# Patient Record
Sex: FEMALE | Race: WHITE | NOT HISPANIC OR LATINO | URBAN - METROPOLITAN AREA
[De-identification: names, ages, dates, MRNs, and addresses within clinical notes are randomized per-mention and may not be internally consistent; named-entity substitution may affect disease eponyms.]

---

## 2023-12-04 NOTE — PROGRESS NOTES
Patient ID: Alka Howell is a 58 y.o. female     Patient Care Team:  Head, TUTU Arevalo as PCP - General (Nurse Practitioner)    Subjective     Chief Complaint   Patient presents with    mental health referral     Daughter passed away 1 1/2 ago. Having concentration and anxiety issues    Establish Care    GI referral     Rectal issue       History of Present Illness    Alka Howell presents to [facility] today to establish care with our practice.      Moved here from [place] a year ago to be closer to family.     Having problems with difficulty concentrating.  Unable to focus on task.  Requesting referral to psychiatry.  Has not taken medication in the past however feels she has suffered from anxiety and depression for awhile.  Was able to control on own. No thoughts of hurting self.    Problems with anxiety and depression. Wakes up every morning with anxiety.  Daughter passed away unexpectedly 1 1/2 years ago.  Unknown cause.  Was found in hotel room.  She had tested + for Covid at the time so autopsy was not completed.  Has not had complete closure concerning this due to unknown cause of her passing. Daily marijuana use which helps control her anxiety.     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  Currently looking for new job.      Has noticed bulge to rectal area.  Possible prolapse.  Has been present for at least 10 years.  Comes out with bowel movements and then has to push back in.  Will also come out with walking around. No constipation or diarrhea,  Will have some rectal bleeding with wiping.    Never had a colonoscopy.      Greater than 10 years ago for Pap and mammogram.    Smoker of 0.25 ppd for 30 years.  Trying to quit however unable due to current stressors.      She denies any complaints of fever, chills, cough, chest pain, shortness of air, abdominal pain, nausea, or any other concerns.     The following  portions of the patient's history were reviewed and updated as appropriate: allergies, current medications, past family history, past medical history, past social history, past surgical history and problem list.       ROS    Vitals:    12/05/23 0823   BP: 130/80   Pulse: 68   Temp: 98 °F (36.7 °C)   SpO2: 98%       Documented weights    12/05/23 0823   Weight: 76.7 kg (169 lb)     Body mass index is 27.28 kg/m².    Results for orders placed or performed in visit on 12/05/23   POCT occult blood x 1 stool    Specimen: Stool   Result Value Ref Range    Fecal Occult Blood Positive (A) Negative    Lot Number [id number]     Expiration Date 3/31/25     DEVELOPER LOT NUMBER [id number]     DEVELOPER EXPIRATION DATE 3/31/25     Positive Control Positive Positive    Negative Control Negative Negative           Objective     Physical Exam  Exam conducted with a chaperone present (Rody Roman MA).   Constitutional:       General: She is not in acute distress.     Appearance: She is well-developed.   HENT:      Head: Normocephalic and atraumatic.      Right Ear: Tympanic membrane normal.      Left Ear: Tympanic membrane normal.      Nose: Nose normal.   Eyes:      Pupils: Pupils are equal, round, and reactive to light.   Cardiovascular:      Rate and Rhythm: Normal rate and regular rhythm.      Heart sounds: Normal heart sounds. No murmur heard.  Pulmonary:      Effort: Pulmonary effort is normal.      Breath sounds: Normal breath sounds. No wheezing, rhonchi or rales.   Abdominal:      General: Bowel sounds are normal.      Palpations: Abdomen is soft.      Tenderness: There is no abdominal tenderness.   Genitourinary:     Rectum: Guaiac result positive. No mass, tenderness or anal fissure. Abnormal anal tone.   Musculoskeletal:         General: No tenderness. Normal range of motion.      Cervical back: Normal range of motion and neck supple.   Skin:     General: Skin is warm and dry.      Findings: No erythema or rash.    Neurological:      Mental Status: She is alert and oriented to person, place, and time.   Psychiatric:         Behavior: Behavior normal.     POCT occult blood x 1 stool (12/05/2023 09:08)    Patient's (Body mass index is 27.28 kg/m².) indicates that they are overweight (BMI 25-29.9) with health related conditions that include none . Weight is newly identified. BMI is is above average; BMI management plan is completed. We discussed portion control, increasing exercise, and Information on healthy weight added to patient's after visit summary.        Assessment & Plan     Assessment/Plan     Diagnoses and all orders for this visit:    1. Encounter to establish care (Primary)    2. Encounter for Hemoccult screening  -     POCT occult blood x 1 stool    3. Healthcare maintenance  -     CBC (No Diff)  -     Lipid Panel With / Chol / HDL Ratio  -     Comprehensive Metabolic Panel  -     TSH Rfx On Abnormal To Free T4    4. Elevated liver function tests  -     Comprehensive Metabolic Panel    5. Screening for lipid disorders  -     Lipid Panel With / Chol / HDL Ratio    6. Anxiety and depression  -     CBC (No Diff)  -     Comprehensive Metabolic Panel  -     TSH Rfx On Abnormal To Free T4  -     Ambulatory Referral to Psychiatry    7. Heme positive stool  -     Ambulatory Referral to Colorectal Surgery    8. Colon cancer screening  -     Ambulatory Referral to Colorectal Surgery    9. Decreased rectal sphincter tone  -     Ambulatory Referral to Colorectal Surgery    10. Encounter for gynecological examination  -     Ambulatory Referral to Gynecology    11. Concentration deficit  -     Ambulatory Referral to Psychiatry      Follow Up:  Return if symptoms worsen or fail to improve, for Annual.    In the meantime, instructed to contact us sooner for any problems or concerns.    Patient was given instructions and counseling regarding condition or for health maintenance advice.  Please see specific information pulled into  the AVS if appropriate.          Lary Camara, APRN  Family Medicine  [facility] [place]  12/05/23  10:12 EST

## 2023-12-05 ENCOUNTER — OFFICE VISIT (OUTPATIENT)
Dept: FAMILY MEDICINE CLINIC | Facility: CLINIC | Age: 58
End: 2023-12-05
Payer: MEDICAID

## 2023-12-05 VITALS
DIASTOLIC BLOOD PRESSURE: 80 MMHG | HEART RATE: 68 BPM | SYSTOLIC BLOOD PRESSURE: 130 MMHG | HEIGHT: 66 IN | BODY MASS INDEX: 27.16 KG/M2 | TEMPERATURE: 98 F | WEIGHT: 169 LBS | OXYGEN SATURATION: 98 %

## 2023-12-05 DIAGNOSIS — F41.9 ANXIETY AND DEPRESSION: ICD-10-CM

## 2023-12-05 DIAGNOSIS — Z76.89 ENCOUNTER TO ESTABLISH CARE: Primary | ICD-10-CM

## 2023-12-05 DIAGNOSIS — F32.A ANXIETY AND DEPRESSION: ICD-10-CM

## 2023-12-05 DIAGNOSIS — Z12.11 ENCOUNTER FOR HEMOCCULT SCREENING: ICD-10-CM

## 2023-12-05 DIAGNOSIS — Z00.00 HEALTHCARE MAINTENANCE: ICD-10-CM

## 2023-12-05 DIAGNOSIS — R41.840 CONCENTRATION DEFICIT: ICD-10-CM

## 2023-12-05 DIAGNOSIS — R19.5 HEME POSITIVE STOOL: ICD-10-CM

## 2023-12-05 DIAGNOSIS — R79.89 ELEVATED LIVER FUNCTION TESTS: ICD-10-CM

## 2023-12-05 DIAGNOSIS — Z13.220 SCREENING FOR LIPID DISORDERS: ICD-10-CM

## 2023-12-05 DIAGNOSIS — Z12.11 COLON CANCER SCREENING: ICD-10-CM

## 2023-12-05 DIAGNOSIS — K62.89 DECREASED RECTAL SPHINCTER TONE: ICD-10-CM

## 2023-12-05 DIAGNOSIS — Z01.419 ENCOUNTER FOR GYNECOLOGICAL EXAMINATION: ICD-10-CM

## 2023-12-05 LAB
DEVELOPER EXPIRATION DATE: ABNORMAL
DEVELOPER LOT NUMBER: ABNORMAL
EXPIRATION DATE: ABNORMAL
FECAL OCCULT BLOOD SCREEN, POC: POSITIVE
Lab: ABNORMAL
NEGATIVE CONTROL: NEGATIVE
POSITIVE CONTROL: POSITIVE

## 2023-12-06 LAB
ALBUMIN SERPL-MCNC: 4.9 G/DL (ref 3.5–5.2)
ALBUMIN/GLOB SERPL: 2.1 G/DL
ALP SERPL-CCNC: 143 U/L (ref 39–117)
ALT SERPL-CCNC: 49 U/L (ref 1–33)
AST SERPL-CCNC: 39 U/L (ref 1–32)
BILIRUB SERPL-MCNC: 0.6 MG/DL (ref 0–1.2)
BUN SERPL-MCNC: 16 MG/DL (ref 6–20)
BUN/CREAT SERPL: 19.8 (ref 7–25)
CALCIUM SERPL-MCNC: 10.1 MG/DL (ref 8.6–10.5)
CHLORIDE SERPL-SCNC: 104 MMOL/L (ref 98–107)
CHOLEST SERPL-MCNC: 246 MG/DL (ref 0–200)
CHOLEST/HDLC SERPL: 3.32 {RATIO}
CO2 SERPL-SCNC: 25.4 MMOL/L (ref 22–29)
CREAT SERPL-MCNC: 0.81 MG/DL (ref 0.57–1)
EGFRCR SERPLBLD CKD-EPI 2021: 85.3 ML/MIN/1.73
ERYTHROCYTE [DISTWIDTH] IN BLOOD BY AUTOMATED COUNT: 13.2 % (ref 12.3–15.4)
GLOBULIN SER CALC-MCNC: 2.3 GM/DL
GLUCOSE SERPL-MCNC: 94 MG/DL (ref 65–99)
HCT VFR BLD AUTO: 44.5 % (ref 34–46.6)
HDLC SERPL-MCNC: 74 MG/DL (ref 40–60)
HGB BLD-MCNC: 15 G/DL (ref 12–15.9)
LDLC SERPL CALC-MCNC: 158 MG/DL (ref 0–100)
MCH RBC QN AUTO: 29.9 PG (ref 26.6–33)
MCHC RBC AUTO-ENTMCNC: 33.7 G/DL (ref 31.5–35.7)
MCV RBC AUTO: 88.6 FL (ref 79–97)
PLATELET # BLD AUTO: 302 10*3/MM3 (ref 140–450)
POTASSIUM SERPL-SCNC: 5.2 MMOL/L (ref 3.5–5.2)
PROT SERPL-MCNC: 7.2 G/DL (ref 6–8.5)
RBC # BLD AUTO: 5.02 10*6/MM3 (ref 3.77–5.28)
SODIUM SERPL-SCNC: 142 MMOL/L (ref 136–145)
TRIGL SERPL-MCNC: 81 MG/DL (ref 0–150)
TSH SERPL DL<=0.005 MIU/L-ACNC: 1.43 UIU/ML (ref 0.27–4.2)
VLDLC SERPL CALC-MCNC: 14 MG/DL (ref 5–40)
WBC # BLD AUTO: 8.19 10*3/MM3 (ref 3.4–10.8)

## 2024-01-12 ENCOUNTER — OFFICE VISIT (OUTPATIENT)
Dept: SURGERY | Facility: CLINIC | Age: 59
End: 2024-01-12
Payer: MEDICAID

## 2024-01-12 VITALS
HEART RATE: 76 BPM | BODY MASS INDEX: 27.42 KG/M2 | WEIGHT: 170.6 LBS | HEIGHT: 66 IN | DIASTOLIC BLOOD PRESSURE: 72 MMHG | OXYGEN SATURATION: 98 % | SYSTOLIC BLOOD PRESSURE: 118 MMHG

## 2024-01-12 DIAGNOSIS — K64.8 INTERNAL HEMORRHOIDS WITH COMPLICATION: Primary | ICD-10-CM

## 2024-01-12 DIAGNOSIS — Z83.719 FAMILY HISTORY OF COLONIC POLYPS: ICD-10-CM

## 2024-01-12 RX ORDER — HYDROCORTISONE 25 MG/G
CREAM TOPICAL
Qty: 30 G | Refills: 1 | Status: SHIPPED | OUTPATIENT
Start: 2024-01-12

## 2024-01-12 NOTE — PROGRESS NOTES
Alka Howell is a 58 y.o. female who is seen as a consult at the request of TUTU Dodge for Rectal Bleeding.      HPI:  Pt presents today for evaluation of prolapsing anal tissue x15 years.   Size of tissue varies. Some days are worse than others.   Tissue prolapses upon defecation and occasionally when coughing.   Has to manually reduce.   Intermittent bleeding with varied amount of blood.   Occasional mucus drainage, but only a small amount.   Denies any associated pain.   Does not use any creams or suppositories.      Has 1 BM daily  Esmeralda: 3 or 5  No straining  Not taking any fiber, SS, or laxatives.     No ASA or anticoagulation.   No previous anorectal surgeries.     Pt has never had a colonoscopy performed in the past.  States she was scheduled for one previously, but became anxious and cancelled it.     Both mother and father with Hx of colon polyps.   No known FHx of colon cancer or IBD.       Past Medical History:   Diagnosis Date    Anxiety     Asthma ?    Shanna had minor asthma since i wss a kid    Concentration deficit     Decreased anal sphincter tone 2023    Depression     Elevated liver function tests 2023    Fracture of right great toe 2023    SEEN AT     Heme positive stool 2023    Rectal bleeding        Past Surgical History:   Procedure Laterality Date    BREAST AUGMENTATION Bilateral     Not sure whst they are i think under the muscle and theyre saline     SECTION N/A        Social History:   reports that she has been smoking cigarettes. She started smoking about 39 years ago. She has a 19.00 pack-year smoking history. She has been exposed to tobacco smoke. She has never used smokeless tobacco. She reports current alcohol use of about 14.0 standard drinks of alcohol per week. She reports current drug use. Drug: Marijuana.      Marriage status: Single    Family History   Problem Relation Age of Onset    Colonic polyp Mother     Colonic polyp Father      Alcohol abuse Father          Current Outpatient Medications:     Hydrocortisone, Perianal, (Anusol-HC) 2.5 % rectal cream, Apply rectally 3 times daily.  Include applicator., Disp: 30 g, Rfl: 1    Allergy  Patient has no known allergies.    Review of Systems   Constitutional: Negative for decreased appetite and weight gain.   HENT:  Negative for congestion, hearing loss and hoarse voice.    Eyes:  Negative for blurred vision, discharge and visual disturbance.   Cardiovascular:  Negative for chest pain, cyanosis and leg swelling.   Respiratory:  Negative for cough, shortness of breath, sleep disturbances due to breathing and snoring.    Endocrine: Negative for cold intolerance and heat intolerance.   Hematologic/Lymphatic: Does not bruise/bleed easily.   Skin:  Negative for itching, poor wound healing and skin cancer.   Musculoskeletal:  Negative for arthritis, back pain, joint pain and joint swelling.   Gastrointestinal:  Positive for hematochezia. Negative for abdominal pain, change in bowel habit, bowel incontinence and constipation.   Genitourinary:  Negative for bladder incontinence, dysuria and hematuria.   Neurological:  Negative for brief paralysis, excessive daytime sleepiness, dizziness, focal weakness, headaches, light-headedness and weakness.   Psychiatric/Behavioral:  Negative for altered mental status and hallucinations. The patient does not have insomnia.    Allergic/Immunologic: Negative for HIV exposure and persistent infections.   All other systems reviewed and are negative.      Vitals:    01/12/24 1114   BP: 118/72   Pulse: 76   SpO2: 98%     Body mass index is 27.54 kg/m².    Physical Exam  Exam conducted with a chaperone present.   Constitutional:       General: She is not in acute distress.     Appearance: She is well-developed.   HENT:      Head: Normocephalic and atraumatic.      Nose: Nose normal.   Eyes:      Conjunctiva/sclera: Conjunctivae normal.      Pupils: Pupils are equal, round,  and reactive to light.   Neck:      Trachea: No tracheal deviation.   Pulmonary:      Effort: Pulmonary effort is normal. No respiratory distress.      Breath sounds: Normal breath sounds.   Abdominal:      General: Bowel sounds are normal. There is no distension.      Palpations: Abdomen is soft.   Genitourinary:     Comments: Perianal exam: Minimal enlargement of the external hemorrhoids. Internal hemorrhoids x3 prolapsing upon bearing down. Pt has a skin bridge posteriorly. Hemorrhoids prolapse through this space as well.   HERNANDEZ- Good tone, no masses  Anoscopy performed: Grade III x 3 internal hemorrhoids. No active bleeding.  Musculoskeletal:         General: No deformity. Normal range of motion.      Cervical back: Normal range of motion.   Skin:     General: Skin is warm and dry.   Neurological:      Mental Status: She is alert and oriented to person, place, and time.      Cranial Nerves: No cranial nerve deficit.      Coordination: Coordination normal.      Gait: Gait normal.   Psychiatric:         Behavior: Behavior normal.         Judgment: Judgment normal.         Review of Medical Record:  I reviewed medical records as detailed in HPI.     Assessment:  1. Internal hemorrhoids with complication    2. Family history of colonic polyps    - New    Plan:  - Discussed common causes of hemorrhoid irritation and enlargement with Pt.   - Encouraged good bowel habits and avoidance of straining.   - Start Fiber. Recommended 30-40g Daily.   - SS PRN  - Rx for Anusol-HC 2.5% Rectal Cream Provided. Apply Internally and Externally TID x7-10 Days. Cautioned against chronic use.   - Discussed RBL of the internal hemorrhoids. Discussed procedure, expected outcomes, typical recovery time, as well as risks and benefits. Pt expresses understanding and wishes to proceed.   - Will schedule colonoscopy with RBL. Follow up 2-3 weeks after this.

## 2024-03-01 ENCOUNTER — HOSPITAL ENCOUNTER (OUTPATIENT)
Facility: HOSPITAL | Age: 59
Setting detail: HOSPITAL OUTPATIENT SURGERY
Discharge: HOME | End: 2024-03-01
Attending: COLON & RECTAL SURGERY | Admitting: COLON & RECTAL SURGERY
Payer: MEDICAID

## 2024-03-01 ENCOUNTER — ANESTHESIA EVENT (OUTPATIENT)
Dept: GASTROENTEROLOGY | Facility: HOSPITAL | Age: 59
End: 2024-03-01
Payer: MEDICAID

## 2024-03-01 ENCOUNTER — ANESTHESIA (OUTPATIENT)
Dept: GASTROENTEROLOGY | Facility: HOSPITAL | Age: 59
End: 2024-03-01
Payer: MEDICAID

## 2024-03-01 VITALS
DIASTOLIC BLOOD PRESSURE: 69 MMHG | SYSTOLIC BLOOD PRESSURE: 115 MMHG | RESPIRATION RATE: 16 BRPM | HEIGHT: 66 IN | HEART RATE: 70 BPM | BODY MASS INDEX: 27.16 KG/M2 | WEIGHT: 169 LBS | OXYGEN SATURATION: 98 %

## 2024-03-01 DIAGNOSIS — K64.8 INTERNAL HEMORRHOIDS WITH COMPLICATION: Primary | ICD-10-CM

## 2024-03-01 DIAGNOSIS — Z83.719 FAMILY HISTORY OF COLONIC POLYPS: ICD-10-CM

## 2024-03-01 PROCEDURE — 25810000003 LACTATED RINGERS PER 1000 ML: Performed by: COLON & RECTAL SURGERY

## 2024-03-01 PROCEDURE — 25010000002 PROPOFOL 10 MG/ML EMULSION: Performed by: NURSE ANESTHETIST, CERTIFIED REGISTERED

## 2024-03-01 PROCEDURE — 88305 TISSUE EXAM BY PATHOLOGIST: CPT | Performed by: COLON & RECTAL SURGERY

## 2024-03-01 PROCEDURE — 25010000002 KETOROLAC TROMETHAMINE PER 15 MG: Performed by: NURSE ANESTHETIST, CERTIFIED REGISTERED

## 2024-03-01 RX ORDER — BUPIVACAINE HYDROCHLORIDE AND EPINEPHRINE 5; 5 MG/ML; UG/ML
INJECTION, SOLUTION EPIDURAL; INTRACAUDAL; PERINEURAL AS NEEDED
Status: DISCONTINUED | OUTPATIENT
Start: 2024-03-01 | End: 2024-03-01 | Stop reason: HOSPADM

## 2024-03-01 RX ORDER — KETOROLAC TROMETHAMINE 30 MG/ML
INJECTION, SOLUTION INTRAMUSCULAR; INTRAVENOUS AS NEEDED
Status: DISCONTINUED | OUTPATIENT
Start: 2024-03-01 | End: 2024-03-01 | Stop reason: SURG

## 2024-03-01 RX ORDER — PROPOFOL 10 MG/ML
VIAL (ML) INTRAVENOUS AS NEEDED
Status: DISCONTINUED | OUTPATIENT
Start: 2024-03-01 | End: 2024-03-01 | Stop reason: SURG

## 2024-03-01 RX ORDER — ALPRAZOLAM 0.5 MG/1
0.5 TABLET ORAL 2 TIMES DAILY PRN
COMMUNITY

## 2024-03-01 RX ORDER — TRAMADOL HYDROCHLORIDE 50 MG/1
TABLET ORAL
Qty: 20 TABLET | Refills: 0 | Status: SHIPPED | OUTPATIENT
Start: 2024-03-01

## 2024-03-01 RX ORDER — SODIUM CHLORIDE, SODIUM LACTATE, POTASSIUM CHLORIDE, CALCIUM CHLORIDE 600; 310; 30; 20 MG/100ML; MG/100ML; MG/100ML; MG/100ML
1000 INJECTION, SOLUTION INTRAVENOUS CONTINUOUS
Status: DISCONTINUED | OUTPATIENT
Start: 2024-03-01 | End: 2024-03-01 | Stop reason: HOSPADM

## 2024-03-01 RX ADMIN — KETOROLAC TROMETHAMINE 30 MG: 30 INJECTION, SOLUTION INTRAMUSCULAR at 13:05

## 2024-03-01 RX ADMIN — SODIUM CHLORIDE, POTASSIUM CHLORIDE, SODIUM LACTATE AND CALCIUM CHLORIDE 1000 ML: 600; 310; 30; 20 INJECTION, SOLUTION INTRAVENOUS at 12:46

## 2024-03-01 RX ADMIN — PROPOFOL 100 MG: 10 INJECTION, EMULSION INTRAVENOUS at 13:01

## 2024-03-01 RX ADMIN — PROPOFOL 200 MCG/KG/MIN: 10 INJECTION, EMULSION INTRAVENOUS at 13:01

## 2024-03-01 NOTE — H&P
Alka Howell is a 58 y.o. female  who is referred by Abhijeet Crowe MD for a colonoscopy. She   has an indications: family history of colon polyps. Anal exam with     She denies any change in bowel function, melena, or hematochezia.    Past Medical History:   Diagnosis Date    Anxiety     Asthma ?    minor asthma since childhood    Concentration deficit     Decreased anal sphincter tone 2023    Depression     Elevated liver function tests 2023    Fracture of right great toe 2023    SEEN AT     Heme positive stool 2023    Loud snoring     Rectal bleeding        Past Surgical History:   Procedure Laterality Date    BREAST AUGMENTATION Bilateral     Not sure whst they are i think under the muscle and theyre saline     SECTION N/A        Medications Prior to Admission   Medication Sig Dispense Refill Last Dose    ALPRAZolam (XANAX) 0.5 MG tablet Take 1 tablet by mouth 2 (Two) Times a Day As Needed for Anxiety.   3/1/2024    Hydrocortisone, Perianal, (Anusol-HC) 2.5 % rectal cream Apply rectally 3 times daily.  Include applicator. 30 g 1        No Known Allergies    Family History   Problem Relation Age of Onset    Colonic polyp Mother     Colonic polyp Father     Alcohol abuse Father     Malig Hyperthermia Neg Hx        Social History     Socioeconomic History    Marital status: Single   Tobacco Use    Smoking status: Every Day     Packs/day: 0.50     Years: 38.00     Additional pack years: 0.00     Total pack years: 19.00     Types: Cigarettes     Start date: 1985     Passive exposure: Current    Smokeless tobacco: Never    Tobacco comments:     Shanna been smoking around 1/2 pack a day since    Vaping Use    Vaping Use: Never used   Substance and Sexual Activity    Alcohol use: Yes     Alcohol/week: 14.0 standard drinks of alcohol     Types: 14 Glasses of wine per week     Comment: I drink about 2 glasses of wine a day some days i dont drink    Drug use: Yes     Types:  Marijuana     Comment: I smoke pot daily    Sexual activity: Yes     Partners: Male     Birth control/protection: Condom       Review of Systems   Constitutional: Negative for decreased appetite and weight gain.   HENT:  Negative for congestion, hearing loss and hoarse voice.    Eyes:  Negative for blurred vision, discharge and visual disturbance.   Cardiovascular:  Negative for chest pain, cyanosis and leg swelling.   Respiratory:  Negative for cough, shortness of breath, sleep disturbances due to breathing and snoring.    Endocrine: Negative for cold intolerance and heat intolerance.   Hematologic/Lymphatic: Does not bruise/bleed easily.   Skin:  Negative for itching, poor wound healing and skin cancer.   Musculoskeletal:  Negative for arthritis, back pain, joint pain and joint swelling.   Gastrointestinal:  Negative for abdominal pain, change in bowel habit, bowel incontinence and constipation.   Genitourinary:  Negative for bladder incontinence, dysuria and hematuria.   Neurological:  Negative for brief paralysis, excessive daytime sleepiness, dizziness, focal weakness, headaches, light-headedness and weakness.   Psychiatric/Behavioral:  Negative for altered mental status and hallucinations. The patient does not have insomnia.    Allergic/Immunologic: Negative for HIV exposure and persistent infections.       Vitals:    03/01/24 1245   BP: 122/92   Pulse: 96   Resp: 16   SpO2: 97%         Physical Exam  Constitutional:       Appearance: She is well-developed.   HENT:      Head: Normocephalic and atraumatic.   Eyes:      Pupils: Pupils are equal, round, and reactive to light.   Cardiovascular:      Rate and Rhythm: Regular rhythm.   Pulmonary:      Effort: Pulmonary effort is normal.   Abdominal:      General: There is no distension.      Palpations: Abdomen is soft.   Musculoskeletal:         General: Normal range of motion.   Skin:     General: Skin is warm and dry.   Neurological:      Mental Status: She is  alert and oriented to person, place, and time.   Psychiatric:         Thought Content: Thought content normal.         Judgment: Judgment normal.           Assessment & Plan      indications: family history of colon polyps         I recommend colonoscopy +/- RBL.  I described risks, benefits of the procedure with the patient including but not limited to bleeding, infection, possibility of perforation and possible polypectomy. All of the patient's questions were answered and they would like to proceed with the above recommendations.

## 2024-03-01 NOTE — ANESTHESIA PREPROCEDURE EVALUATION
Anesthesia Evaluation     Patient summary reviewed   no history of anesthetic complications:   NPO Solid Status: > 8 hours  NPO Liquid Status: > 2 hours           Airway   Mallampati: II  TM distance: >3 FB  Neck ROM: full  No difficulty expected  Dental      Pulmonary     breath sounds clear to auscultation  (+) a smoker Current, asthma,  (-) shortness of breath, recent URISleep apnea: snores.  Cardiovascular   Exercise tolerance: good (4-7 METS)    Rhythm: regular  Rate: normal    (-) past MI, dysrhythmias, angina      Neuro/Psych  (+) psychiatric history Anxiety and Depression  (-) seizures, CVA  GI/Hepatic/Renal/Endo    (+) GI bleeding lower   (-) diabetesRenal disease: Cr 0.81.    Musculoskeletal     (-) neck stiffness  Abdominal    Substance History   (+) drug use (daily marijuana)     OB/GYN          Other        (-) blood dyscrasia (Hb 15.0)                    Anesthesia Plan    ASA 2     MAC     (MAC anesthesia discussed with patient and/or patient representative. Risks (including but not limited to intra-op awareness), benefits, and alternatives were discussed. Understanding was voiced with an agreement to proceed with a MAC technique and General as a backup option. )    Anesthetic plan, risks, benefits, and alternatives have been provided, discussed and informed consent has been obtained with: patient.        CODE STATUS:

## 2024-03-01 NOTE — ANESTHESIA POSTPROCEDURE EVALUATION
"Patient: Alka Howell    Procedure Summary       Date: 03/01/24 Room / Location:  [facility]    Anesthesia Start: 1257 Anesthesia Stop: 1327    Procedures:       HEMORRHOID BANDING x2 with marcaine/epi injection (Rectum)      COLONOSCOPY to cecum with cold biopsy polypectomies and hot snare polypectomy Diagnosis:       Family history of colonic polyps      (Family history of colonic polyps [Z83.719])    Surgeons: Abhijeet Crowe MD Provider: Claudy Rogel DO    Anesthesia Type: MAC ASA Status: 2            Anesthesia Type: MAC    Vitals  Vitals Value Taken Time   /69 03/01/24 1350   Temp     Pulse 68 03/01/24 1352   Resp 16 03/01/24 1350   SpO2 97 % 03/01/24 1352   Vitals shown include unfiled device data.        Post Anesthesia Care and Evaluation    Patient location during evaluation: bedside  Patient participation: complete - patient participated  Level of consciousness: awake and alert  Pain management: adequate    Airway patency: patent  Anesthetic complications: No anesthetic complications  PONV Status: controlled  Cardiovascular status: acceptable and hemodynamically stable  Respiratory status: acceptable, spontaneous ventilation and nonlabored ventilation  Hydration status: acceptable    Comments: /69 (BP Location: Left arm, Patient Position: Lying)   Pulse 70   Resp 16   Ht 167.6 cm (66\")   Wt 76.7 kg (169 lb)   LMP  (LMP Unknown)   SpO2 98%   BMI 27.28 kg/m²       "

## 2024-03-01 NOTE — DISCHARGE INSTRUCTIONS
DIET:    We recommend a high fiber diet to keep your stool soft and to prevent constipation.  Eat plenty of fruits and vegetables.  Drink 8-10 glasses of water or juice every day.  Eat whole grain cereals and breads.  Salads with raw vegetables are also a good source of fiber.    STOOL :    Metamucil, Citrucil, Konsyl, Fibercon, Benefiber or Miralax.  Take ____ tablespoon(s)/teaspoon(s) in a glass of water or juice _____ time(s) per day.      PAIN CONTROL:    Sit in a warm tub of water to relieve minor discomfort.  Use Tylenol or other non-aspirin medication.  Do not take aspirin for 10 days following procedure unless ordered by your physician.  Avoid the use of narcotics, such as codeine, because they may cause constipation.    COMPLICATIONS:    1. A small amount of bright red bleeding can be expected.  If bleeding persists or if it is     greater than 1 cup full of blood, call your doctor.    2.  If you have severe pain, fever (greater that 101) or if you are unable to urinate within 6 hours following hemorrhoidal treatment, call your doctor.  For the next 24 hours patient needs to be with a responsible adult.    For 24 hours DO NOT drive, operate machinery, appliances, drink alcohol, make important decisions or sign legal documents.    Start with a light or bland diet if you are feeling sick to your stomach otherwise advance to regular diet as tolerated.    Follow recommendations on procedure report if provided by your doctor.    Call Dr Crowe for problems [phone]    Problems may include but not limited to: large amounts of bleeding, trouble breathing, repeated vomiting, severe unrelieved pain, fever or chills.

## 2024-03-04 LAB
LAB AP CASE REPORT: NORMAL
PATH REPORT.FINAL DX SPEC: NORMAL
PATH REPORT.GROSS SPEC: NORMAL

## 2024-03-22 ENCOUNTER — OFFICE VISIT (OUTPATIENT)
Dept: SURGERY | Facility: CLINIC | Age: 59
End: 2024-03-22
Payer: MEDICAID

## 2024-03-22 VITALS
HEART RATE: 66 BPM | HEIGHT: 66 IN | BODY MASS INDEX: 25.96 KG/M2 | WEIGHT: 161.5 LBS | DIASTOLIC BLOOD PRESSURE: 78 MMHG | OXYGEN SATURATION: 99 % | SYSTOLIC BLOOD PRESSURE: 126 MMHG

## 2024-03-22 DIAGNOSIS — K64.8 INTERNAL HEMORRHOIDS WITH COMPLICATION: Primary | ICD-10-CM

## 2024-03-22 NOTE — PROGRESS NOTES
"Alka Howell is a 58 y.o. female in for follow up of Internal hemorrhoids with complication    Pt was first seen in our office 01/12/2024 for prolapsing internal hemorrhoids and bleeding.   She was recommended to undergo a colonoscopy with RBL, which was performed 03/01/2024.  She was found to have 3 benign polyps and grade III internal hemorrhoids x3, which were each banded.   Pt presents today for a follow up.  States she changed her diet after the colonoscopy and is avoiding bread.  She is also eating plant-based meats.   Noticed significant improvement in her bowel function and is defecating quickly and without straining.   Mild spotting bleeding initially following RBL.  This resolved after a few days and denies any more prolapsing tissue.  Denies any pain after RBL.  States the procedure \"changed her life\" and she is very happy with the results.     /78 (BP Location: Left arm, Patient Position: Sitting, Cuff Size: Adult)   Pulse 66   Ht 167.6 cm (66\")   Wt 73.3 kg (161 lb 8 oz)   LMP  (LMP Unknown)   SpO2 99%   BMI 26.07 kg/m²   Body mass index is 26.07 kg/m².      PE:  Physical Exam  Constitutional:       General: She is not in acute distress.     Appearance: She is well-developed.   HENT:      Head: Normocephalic and atraumatic.   Abdominal:      General: There is no distension.      Palpations: Abdomen is soft.   Musculoskeletal:         General: Normal range of motion.   Neurological:      Mental Status: She is alert.   Psychiatric:         Thought Content: Thought content normal.         Review of Medical Record:    Colonoscopy 03/01/2024:  - 5 mm hyperplastic polyp, sigmoid colon  - 6 mm benign polyp with reactive changes and telangiectatic vessels, anus  - Grade III internal hemorrhoids. S/P RBL x3  - Posterior anal fistula  - 4 mm hyperplastic polyp, rectum  - Rescope: 5 years  - Dr. Abhijeet Crowe, [facility]    FHx:  - Mother: Hx of colon polyps  - Father: Hx of colon polyps.   - No known FHx " of colon cancer or IBD.     Assessment:   1. Internal hemorrhoids with complication    - RBL x3 on 03/01/2024  - Resolved    Plan:  - Pt achieved significant improvement in hemorrhoidal symptoms following RBL. Reviewed common causes of hemorrhoid irritation and ways to avoid future flare-ups.   - Reviewed pathology from colonoscopy. Next colonoscopy due 2029.   - Follow up in the office as needed.

## 2024-12-12 NOTE — PROGRESS NOTES
Chief Complaint   Patient presents with    referrals      Mental health and gyno     Annual Exam   Answers submitted by the patient for this visit:  Primary Reason for Visit (Submitted on 12/13/2024)  What is the primary reason for your visit?: Problem Not Listed  Problem not listed (Submitted on 12/13/2024)  Chief Complaint: Other medical problem  Reason for appointment: Check up blood work and referral for gyno and mental  anorexia: No  joint pain: No  change in stool: No  joint swelling: No  swollen glands: No  vertigo: No  visual change: No      Patient Care Team:  Head, TUTU Arevalo as PCP - General (Nurse Practitioner)  Shelly Viramontes PA-C as Physician Assistant (Colon and Rectal Surgery)    Roger   Alka Howell is a 59 y.o. female and is here for a yearly physical exam.     History of Present Illness  The patient presents for an annual checkup.    She has not yet followed through on the GYN referral made last year. She has lost approximately 18 pounds, a change she attributes to her reduced alcohol consumption. She is not on any regular medications. She has had a colonoscopy since her last visit. She recently visited the dentist due to a gum abscess and has scheduled appointments for tooth extractions. She has not consulted an ophthalmologist recently but requires reading glasses. She is not gluten sensitive or vegetarian. She walks with her dogs every day. She has not received the influenza vaccine this year. She experienced a severe illness approximately 1.5 to 2 months ago, characterized by persistent coughing and phlegm production, which was managed with an inhaler. She continues to experience phlegm production but does not feel unwell. She has a history of shingles and is interested in receiving the shingles vaccine.    She reports elevated liver enzymes and cholesterol levels. She is not on any regular medications.    She has been experiencing anxiety and depression since the unexpected  "death of her daughter. She reports a lack of interest in activities such as showering and house cleaning, and feels mentally fatigued. 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    She has a smoking history of 0.5 pack per day for 40 years and is interested in lung cancer screening.    SOCIAL HISTORY  The patient admits to drinking three White Claws a day, down from three to four glasses of wine previously. She works as a  at [employer].    Results      Do you take any herbs or supplements that were not prescribed by a doctor? no. If so, these will be added to active medication list.    Health Habits:  Colonoscopy:  ENDOSCOPY - COLON (03/01/2024)  Greater than 10 years ago for Pap and mammogram.    Smoker of 0.5 ppd for 40 years.  Trying to quit however unable due to current stressors.      The following portions of the patient's history were reviewed and updated as appropriate: allergies, current medications, past family history, past medical history, past social history, past surgical history, and problem list.    Social and Family and Surgical History reviewed and updated today, see Rooming tab.    Health History, Preventive Measures and Vaccination flow sheets reviewed and updated today.    Patient's current medical chart in Epic; including previous office notes, imaging, labs, specialist's evaluation either in notes or in Media tab reviewed today.    Other pertinent medical information also reviewed thru Care Everywhere function is also reviewed today.    Review of Systems  Review of Systems  Vitals:    12/13/24 1314   BP: 120/80   BP Location: Left arm   Patient Position: Sitting   Cuff Size: Adult   Pulse: 69   Temp: 97.7 °F (36.5 °C)   SpO2: 100%   Weight: 68.5 kg (151 lb)   Height: 167.6 cm (66\")     Wt Readings from Last 3 Encounters:   12/13/24 68.5 kg (151 lb)   03/22/24 73.3 kg (161 lb 8 oz)   03/01/24 76.7 kg " (169 lb)       General Appearance:  Alert, cooperative, no distress, appears stated age   Head:  Normocephalic, without obvious abnormality, atraumatic   Eyes:  PERRL, conjunctiva/corneas clear, EOM's intact.   Ears:  Normal TM's and external ear canals, both ears   Nose: Nares normal, septum midline, mucosa normal, no drainage or sinus tenderness   Throat: Lips, mucosa, and tongue normal; teeth and gums normal   Neck: Supple, symmetrical, trachea midline, no adenopathy;   thyroid: No enlargement/tenderness/nodules       Lungs:  Clear to auscultation bilaterally after coughing, respirations even and unlabored   Chest wall:  No tenderness or deformity   Heart:  Regular rate and rhythm, S1 and S2 normal, no murmur, rub or gallop   Abdomen:  Soft, non-tender, bowel sounds active all four quadrants,   no masses, no organomegaly           Extremities: Extremities normal, atraumatic, no cyanosis or edema   Pulses: 2+ and symmetric all extremities   Skin: Skin color, texture, turgor normal, no rashes or lesions   Lymph nodes: Cervical and supraclavicular nodes normal   Neurologic: CNII-XII intact. Normal strength.       BMI is >= 25 and <30. (Overweight) The following options were offered after discussion;: exercise counseling/recommendations, nutrition counseling/recommendations, and information on healthy weight added to patient's after visit summary        The 10-year ASCVD risk score (Darlene JOEL, et al., 2019) is: 4.6%    Values used to calculate the score:      Age: 59 years      Sex: Female      Is Non- : No      Diabetic: No      Tobacco smoker: Yes      Systolic Blood Pressure: 120 mmHg      Is BP treated: No      HDL Cholesterol: 74 mg/dL      Total Cholesterol: 246 mg/dL       Assessment & Plan     Assessment & Plan  1. Annual examination.   A new referral to a gynecologist will be initiated. She is advised to consult an ophthalmologist if she experiences any unusual headaches or vision  changes. She will be contacted to schedule the referrals that have been placed.    2. Elevated liver enzymes.  She reported that her liver levels were high during the last check. She has reduced her alcohol intake to three White Claws a day. Blood work will be ordered to re-evaluate her liver function.    3. Elevated cholesterol.  She mentioned that her cholesterol was high. Blood work will be ordered to re-evaluate her cholesterol levels.    4. Anxiety and depression.  She reported ongoing symptoms of anxiety and depression, including lack of interest in activities and feeling mentally fatigued. A referral to psychiatry will be initiated for further evaluation and management.    5. Smoking history.  She is a current smoker, smoking about 1/2 pack a day for 40 years. A CT scan for lung cancer screening will be scheduled due to her smoking history.      Diagnoses and all orders for this visit:    1. Annual physical exam (Primary)  -     CBC (No Diff)  -     Comprehensive Metabolic Panel  -     Lipid Panel With / Chol / HDL Ratio  -     TSH Rfx On Abnormal To Free T4  -     UA / M With / Rflx Culture([facility] ONLY) - Urine, Clean Catch    2. Anxiety and depression  -     CBC (No Diff)  -     Comprehensive Metabolic Panel  -     Ambulatory Referral to Psychiatry    3. Encounter for hepatitis C screening test for low risk patient  -     HCV Antibody Rfx To Qnt PCR    4. Encounter for gynecological examination  -     Ambulatory Referral to Gynecology    5. Screening for lung cancer  -     CT Chest Low Dose Wo; Future        1. Patient Counseling:  --Nutrition: Stressed importance of moderation in sodium/caffeine intake, saturated fat and cholesterol.  Discussed caloric balance, sufficient intake of fresh fruits, vegetables, fiber,    calcium, iron.  --Exercise: Stressed the importance of regular exercise.   --Substance Abuse: Discussed cessation/primary prevention of tobacco, alcohol, or other drug use; driving or other  dangerous activities under the influence.    --Dental health: Discussed importance of regular tooth brushing, flossing, and dental visits.  --Suggested having eyes and vision checked if needed or past due.  --Immunizations reviewed.  --Discussed benefits of screening colonoscopy.  2. Discussed the patient's BMI with her.  The BMI is in the acceptable range  3. Follow up next physical in 1 year    Patient was given instructions and counseling regarding condition or for health maintenance advice.  Please see specific information pulled into the AVS if appropriate.      Medications Discontinued During This Encounter   Medication Reason    traMADol (Ultram) 50 MG tablet *Therapy completed    Hydrocortisone, Perianal, (Anusol-HC) 2.5 % rectal cream *Therapy completed    ALPRAZolam (XANAX) 0.5 MG tablet *Therapy completed          Patient or patient representative verbalized consent for the use of Ambient Listening during the visit with  TUTU Soto for chart documentation. 12/13/2024  16:02 TUTU Gerber  Family Practice  [facility] [place]

## 2024-12-13 ENCOUNTER — OFFICE VISIT (OUTPATIENT)
Dept: FAMILY MEDICINE CLINIC | Facility: CLINIC | Age: 59
End: 2024-12-13
Payer: COMMERCIAL

## 2024-12-13 VITALS
BODY MASS INDEX: 24.27 KG/M2 | TEMPERATURE: 97.7 F | SYSTOLIC BLOOD PRESSURE: 120 MMHG | HEIGHT: 66 IN | DIASTOLIC BLOOD PRESSURE: 80 MMHG | HEART RATE: 69 BPM | WEIGHT: 151 LBS | OXYGEN SATURATION: 100 %

## 2024-12-13 DIAGNOSIS — Z00.00 ANNUAL PHYSICAL EXAM: Primary | ICD-10-CM

## 2024-12-13 DIAGNOSIS — F32.A ANXIETY AND DEPRESSION: ICD-10-CM

## 2024-12-13 DIAGNOSIS — Z12.2 SCREENING FOR LUNG CANCER: ICD-10-CM

## 2024-12-13 DIAGNOSIS — Z11.59 ENCOUNTER FOR HEPATITIS C SCREENING TEST FOR LOW RISK PATIENT: ICD-10-CM

## 2024-12-13 DIAGNOSIS — F41.9 ANXIETY AND DEPRESSION: ICD-10-CM

## 2024-12-13 DIAGNOSIS — Z01.419 ENCOUNTER FOR GYNECOLOGICAL EXAMINATION: ICD-10-CM

## 2024-12-15 LAB
ALBUMIN SERPL-MCNC: 4.7 G/DL (ref 3.8–4.9)
ALP SERPL-CCNC: 161 IU/L (ref 44–121)
ALT SERPL-CCNC: 27 IU/L (ref 0–32)
APPEARANCE UR: CLEAR
AST SERPL-CCNC: 34 IU/L (ref 0–40)
BACTERIA #/AREA URNS HPF: ABNORMAL /[HPF]
BILIRUB SERPL-MCNC: 0.3 MG/DL (ref 0–1.2)
BILIRUB UR QL STRIP: NEGATIVE
BUN SERPL-MCNC: 16 MG/DL (ref 6–24)
BUN/CREAT SERPL: 19 (ref 9–23)
CALCIUM SERPL-MCNC: 10.3 MG/DL (ref 8.7–10.2)
CASTS URNS QL MICRO: ABNORMAL /LPF
CHLORIDE SERPL-SCNC: 101 MMOL/L (ref 96–106)
CHOLEST SERPL-MCNC: 253 MG/DL (ref 100–199)
CHOLEST/HDLC SERPL: 3.8 RATIO (ref 0–4.4)
CO2 SERPL-SCNC: 19 MMOL/L (ref 20–29)
COLOR UR: YELLOW
CREAT SERPL-MCNC: 0.83 MG/DL (ref 0.57–1)
EGFRCR SERPLBLD CKD-EPI 2021: 82 ML/MIN/1.73
EPI CELLS #/AREA URNS HPF: ABNORMAL /HPF (ref 0–10)
ERYTHROCYTE [DISTWIDTH] IN BLOOD BY AUTOMATED COUNT: 13 % (ref 11.7–15.4)
GLOBULIN SER CALC-MCNC: 3 G/DL (ref 1.5–4.5)
GLUCOSE SERPL-MCNC: 96 MG/DL (ref 70–99)
GLUCOSE UR QL STRIP: NEGATIVE
HCT VFR BLD AUTO: 47 % (ref 34–46.6)
HCV AB SERPL QL IA: NORMAL
HCV IGG SERPL QL IA: NON REACTIVE
HDLC SERPL-MCNC: 67 MG/DL
HGB BLD-MCNC: 15.4 G/DL (ref 11.1–15.9)
HGB UR QL STRIP: ABNORMAL
KETONES UR QL STRIP: NEGATIVE
LDLC SERPL CALC-MCNC: 158 MG/DL (ref 0–99)
LEUKOCYTE ESTERASE UR QL STRIP: NEGATIVE
MCH RBC QN AUTO: 30.1 PG (ref 26.6–33)
MCHC RBC AUTO-ENTMCNC: 32.8 G/DL (ref 31.5–35.7)
MCV RBC AUTO: 92 FL (ref 79–97)
MICRO URNS: ABNORMAL
NITRITE UR QL STRIP: NEGATIVE
PH UR STRIP: 5.5 [PH] (ref 5–7.5)
PLATELET # BLD AUTO: 304 X10E3/UL (ref 150–450)
POTASSIUM SERPL-SCNC: 4.6 MMOL/L (ref 3.5–5.2)
PROT SERPL-MCNC: 7.7 G/DL (ref 6–8.5)
PROT UR QL STRIP: NEGATIVE
RBC # BLD AUTO: 5.11 X10E6/UL (ref 3.77–5.28)
RBC #/AREA URNS HPF: ABNORMAL /HPF (ref 0–2)
SODIUM SERPL-SCNC: 140 MMOL/L (ref 134–144)
SP GR UR STRIP: 1.02 (ref 1–1.03)
TRIGL SERPL-MCNC: 157 MG/DL (ref 0–149)
TSH SERPL DL<=0.005 MIU/L-ACNC: 2.15 UIU/ML (ref 0.45–4.5)
URINALYSIS REFLEX: ABNORMAL
UROBILINOGEN UR STRIP-MCNC: 0.2 MG/DL (ref 0.2–1)
VLDLC SERPL CALC-MCNC: 28 MG/DL (ref 5–40)
WBC # BLD AUTO: 8.9 X10E3/UL (ref 3.4–10.8)
WBC #/AREA URNS HPF: ABNORMAL /HPF (ref 0–5)

## 2025-02-18 RX ORDER — VARENICLINE TARTRATE 0.5 (11)-1
KIT ORAL
Qty: 1 EACH | Refills: 0 | Status: SHIPPED | OUTPATIENT
Start: 2025-02-18 | End: 2025-03-18

## 2025-02-18 RX ORDER — VARENICLINE TARTRATE 1 MG/1
1 TABLET, FILM COATED ORAL 2 TIMES DAILY
Qty: 56 TABLET | Refills: 1 | Status: SHIPPED | OUTPATIENT
Start: 2025-03-18 | End: 2025-05-13

## 2025-03-05 ENCOUNTER — TELEPHONE (OUTPATIENT)
Dept: FAMILY MEDICINE CLINIC | Facility: CLINIC | Age: 60
End: 2025-03-05
Payer: COMMERCIAL

## 2025-03-05 NOTE — TELEPHONE ENCOUNTER
ANTONELLA - Called and spoke to specialist office - the patient has been scheduled two times and cancelled both times.  The office is willing to schedule the patient a 3rd time but if patient cancels again she will not be able to schedule with the specialists office again.  LVM for patient with the above information.  I will also send a follow up letter to the patient.

## 2025-04-30 ENCOUNTER — HOSPITAL ENCOUNTER (OUTPATIENT)
Facility: HOSPITAL | Age: 60
Discharge: HOME | End: 2025-04-30
Admitting: NURSE PRACTITIONER
Payer: COMMERCIAL

## 2025-04-30 DIAGNOSIS — Z12.2 SCREENING FOR LUNG CANCER: ICD-10-CM

## 2025-04-30 PROCEDURE — 71271 CT THORAX LUNG CANCER SCR C-: CPT

## 2025-06-08 ENCOUNTER — HOSPITAL ENCOUNTER (OUTPATIENT)
Facility: HOSPITAL | Age: 60
Discharge: HOME | End: 2025-06-08
Admitting: NURSE PRACTITIONER
Payer: COMMERCIAL

## 2025-06-08 DIAGNOSIS — N28.9 RENAL LESION: ICD-10-CM

## 2025-06-08 PROCEDURE — 74177 CT ABD & PELVIS W/CONTRAST: CPT

## 2025-06-08 PROCEDURE — 25510000001 IOPAMIDOL 61 % SOLUTION: Performed by: NURSE PRACTITIONER

## 2025-06-08 RX ORDER — IOPAMIDOL 612 MG/ML
100 INJECTION, SOLUTION INTRAVASCULAR
Status: COMPLETED | OUTPATIENT
Start: 2025-06-08 | End: 2025-06-08

## 2025-06-08 RX ADMIN — IOPAMIDOL 85 ML: 612 INJECTION, SOLUTION INTRAVENOUS at 13:42

## 2025-06-12 DIAGNOSIS — N28.89 RENAL MASS, RIGHT: Primary | ICD-10-CM

## 2025-06-16 ENCOUNTER — TELEPHONE (OUTPATIENT)
Dept: FAMILY MEDICINE CLINIC | Facility: CLINIC | Age: 60
End: 2025-06-16

## 2025-06-16 NOTE — TELEPHONE ENCOUNTER
Caller: Alka Howell    Relationship: Self    Best call back number: [phone]     What was the call regarding: PATIENT IS REQUESTING A CALL BACK FROM LYNN JANETTE TO DISCUSS HER NEXT STEPS REGARDING HER TEST RESULTS    PLEASE CALL AND ADVISE

## 2025-07-18 ENCOUNTER — OFFICE VISIT (OUTPATIENT)
Dept: OBSTETRICS AND GYNECOLOGY | Age: 60
End: 2025-07-18
Payer: COMMERCIAL

## 2025-07-18 VITALS
BODY MASS INDEX: 25.36 KG/M2 | WEIGHT: 157.8 LBS | HEIGHT: 66 IN | SYSTOLIC BLOOD PRESSURE: 134 MMHG | DIASTOLIC BLOOD PRESSURE: 86 MMHG

## 2025-07-18 DIAGNOSIS — Z13.89 SCREENING FOR HEMATURIA OR PROTEINURIA: ICD-10-CM

## 2025-07-18 DIAGNOSIS — Z12.31 ENCOUNTER FOR SCREENING MAMMOGRAM FOR MALIGNANT NEOPLASM OF BREAST: ICD-10-CM

## 2025-07-18 DIAGNOSIS — Z01.419 ENCOUNTER FOR ANNUAL ROUTINE GYNECOLOGICAL EXAMINATION: Primary | ICD-10-CM

## 2025-07-18 PROBLEM — A60.04 TYPE 2 HSV INFECTION OF VULVOVAGINAL REGION: Status: ACTIVE | Noted: 2025-07-18

## 2025-07-18 LAB
BILIRUB BLD-MCNC: NEGATIVE MG/DL
CLARITY, POC: CLEAR
COLOR UR: YELLOW
GLUCOSE UR STRIP-MCNC: NEGATIVE MG/DL
KETONES UR QL: NEGATIVE
LEUKOCYTE EST, POC: NEGATIVE
NITRITE UR-MCNC: NEGATIVE MG/ML
PH UR: 7 [PH] (ref 5–8)
PROT UR STRIP-MCNC: NEGATIVE MG/DL
RBC # UR STRIP: NEGATIVE /UL
SP GR UR: 1.02 (ref 1–1.03)
UROBILINOGEN UR QL: NORMAL

## 2025-07-18 RX ORDER — DEXTROAMPHETAMINE SACCHARATE, AMPHETAMINE ASPARTATE MONOHYDRATE, DEXTROAMPHETAMINE SULFATE AND AMPHETAMINE SULFATE 2.5; 2.5; 2.5; 2.5 MG/1; MG/1; MG/1; MG/1
CAPSULE, EXTENDED RELEASE ORAL
COMMUNITY
Start: 2025-07-12

## 2025-07-18 RX ORDER — BUPROPION HYDROCHLORIDE 300 MG/1
TABLET ORAL
COMMUNITY
Start: 2025-06-30

## 2025-07-18 RX ORDER — BUPROPION HYDROCHLORIDE 150 MG/1
TABLET ORAL
COMMUNITY
Start: 2025-04-11

## 2025-07-18 NOTE — PROGRESS NOTES
"[facility]  Obstetrics and Gynecology   Routine Annual Visit    2025    Patient: Alka Howell          MR#:[id number]    History of Present Illness    Chief Complaint   Patient presents with    Gynecologic Exam     New gyn- last pap/mammo unknown per pt-- no concerns     60 y.o. female  who presents for annual exam.    Reports her last menstrual cycle occurred around the age of 47.  She has some night sweats that are bothersome, occurring every night.  Present for a few years.  She is not really experiencing any hot flashes during the day.  She also has vaginal dryness and irritation but does report she douches daily.  Feels gross when she does not.  She usually douches with water but sometimes will use a Summers Whit douche that has vinegar and water.  She knows it is not good to do this but she is having a mental block with stopping the process because she feels like she smells \"off\" when she does not douche.  She does not ever have any abnormal discharge.  No postmenopausal bleeding    Has a known vulvovaginal herpes infection but has not had an outbreak in several years.    Obstetric History:  OB History          4    Para   1    Term           1    AB   3    Living   1         SAB        IAB        Ectopic        Molar        Multiple   1    Live Births   2               Menstrual History:     No LMP recorded (lmp unknown). Patient is postmenopausal.       Sexual History:   Monogamous sexual relationship with male    Concern for IPV: denies  Dyspareunia: denies  Breast concerns: denies  Fecal/urine incontinence: rare SU, worse with full bladder or cough, declines referral to pelvic floor physical therapy  Concern for STI?: denies    Current contraception: post menopausal status  History of abnormal Pap smear: no  Received Gardasil immunization:  no  History of abnormal mammogram: no  Colon cancer screenin polyps -> due "   ________________________________________  Patient Active Problem List   Diagnosis    Family history of colonic polyps    Type 2 HSV infection of vulvovaginal region     Past Medical History:   Diagnosis Date    Asthma ?    minor asthma since childhood    Colon polyps     FOLLOWED BY DR. ABHIJEET GUIDO AT [facility]    Concentration deficit     Decreased anal sphincter tone 2023    Depression     Elevated liver function tests 2023    Fracture of right great toe 2023    SEEN AT     Heme positive stool 2023    Loud snoring     Rectal bleeding      Past Surgical History:   Procedure Laterality Date    BREAST AUGMENTATION Bilateral     Not sure whst they are i think under the muscle and theyre saline     SECTION N/A     COLONOSCOPY N/A 2024    5 MM BENIGN POLYP IN SIGMOID, 6 MM BENIGN POLYP IN ANUS, 4 MM BENIGN POLYP IN RECTUM, GRADE 3 INTERNAL HEMORRHOIDS BANDED, RESCOPE IN 5 YRS, DR. ABHIJEET GUIDO AT [facility]    HEMORRHOIDECTOMY N/A 2024    Procedure: HEMORRHOID BANDING x2 with marcaine/epi injection;  Surgeon: Abhijeet Guido MD;  Location: [facility] ENDOSCOPY;  Service: General;  Laterality: N/A;  Pre:  Post:     Social History     Tobacco Use    Smoking status: Every Day     Current packs/day: 0.50     Average packs/day: 0.5 packs/day for 40.5 years (20.3 ttl pk-yrs)     Types: Cigarettes     Start date: 1985     Passive exposure: Current    Smokeless tobacco: Never    Tobacco comments:     Shanna been smoking around 1/2 pack a day since    Vaping Use    Vaping status: Never Used   Substance Use Topics    Alcohol use: Yes     Alcohol/week: 18.0 standard drinks of alcohol     Types: 18 Glasses of wine per week    Drug use: Yes     Types: Marijuana     Comment: I smoke pot daily     Family History   Problem Relation Age of Onset    Colonic polyp Father     Alcohol abuse Father     Colonic polyp Mother     Brain cancer Maternal Aunt     Breast cancer Cousin     Malig Hyperthermia Neg  "Hx     Ovarian cancer Neg Hx     Uterine cancer Neg Hx     Colon cancer Neg Hx     Pancreatic cancer Neg Hx     Prostate cancer Neg Hx      Prior to Admission medications    Medication Sig Start Date End Date Taking? Authorizing Provider   amphetamine-dextroamphetamine XR (ADDERALL XR) 10 MG 24 hr capsule  7/12/25  Yes ProviderLaura MD   buPROPion XL (WELLBUTRIN XL) 150 MG 24 hr tablet  4/11/25  Yes Laura William MD   buPROPion XL (WELLBUTRIN XL) 300 MG 24 hr tablet  6/30/25  Yes Provider, MD Laura   amoxicillin (AMOXIL) 500 MG capsule  12/11/24   ProviderLaura MD   methylPREDNISolone (MEDROL) 4 MG dose pack Take as directed on package instructions. 2/10/25   Cooksey, John Calvin, PA-C     ________________________________________    Review of Systems   Genitourinary:  Negative for dyspareunia, vaginal bleeding and vaginal discharge.          Objective     /86 (BP Location: Right arm)   Ht 167.6 cm (65.98\")   Wt 71.6 kg (157 lb 12.8 oz)   LMP  (LMP Unknown)   BMI 25.48 kg/m²    BP Readings from Last 3 Encounters:   07/18/25 134/86   02/10/25 136/87   01/12/25 137/94      Wt Readings from Last 3 Encounters:   07/18/25 71.6 kg (157 lb 12.8 oz)   02/10/25 68 kg (150 lb)   01/12/25 68.5 kg (151 lb)        BMI: Estimated body mass index is 25.48 kg/m² as calculated from the following:    Height as of this encounter: 167.6 cm (65.98\").    Weight as of this encounter: 71.6 kg (157 lb 12.8 oz).    Physical Exam  Vitals and nursing note reviewed.   Constitutional:       General: She is not in acute distress.     Appearance: Normal appearance.   HENT:      Head: Normocephalic and atraumatic.   Eyes:      Extraocular Movements: Extraocular movements intact.   Pulmonary:      Effort: Pulmonary effort is normal. No respiratory distress.   Chest:   Breasts:     Right: Normal. No mass, nipple discharge, skin change or tenderness.      Left: Normal. No mass, nipple discharge, skin change or " tenderness.      Comments: Bilateral implants present  Abdominal:      General: There is no distension.      Palpations: Abdomen is soft. There is no mass.      Tenderness: There is no abdominal tenderness.   Genitourinary:     General: Normal vulva.      Pubic Area: No rash.       Labia:         Right: No rash, lesion or injury.         Left: No rash, lesion or injury.       Urethra: No urethral lesion.      Vagina: Normal.      Cervix: Normal.      Uterus: Normal.       Adnexa: Right adnexa normal and left adnexa normal.      Rectum: External hemorrhoid present.   Lymphadenopathy:      Upper Body:      Right upper body: No supraclavicular or axillary adenopathy.      Left upper body: No supraclavicular or axillary adenopathy.   Skin:     General: Skin is warm and dry.   Neurological:      General: No focal deficit present.      Mental Status: She is alert.   Psychiatric:         Mood and Affect: Mood normal.         Behavior: Behavior normal.       Assessment:  Alka Howell is a 60 y.o.  presenting for well woman exam.       Encounter for annual routine gynecological examination    Orders:    IGP, Apt HPV,rfx 16 / 18,45    Encounter for screening mammogram for malignant neoplasm of breast    Orders:    Mammo Screening Digital Tomosynthesis Bilateral With CAD    Screening for hematuria or proteinuria    Orders:    POC Urinalysis Dipstick      Reports drinking 2-3 alcoholic beverages per night.  Encourage cessation and advised no more than 1 per night.  She is working on cutting back on tobacco use, currently smoking around a quart a pack per day.  Also encouraged cessation of this and offered support.    Declined initiation of HRT for vasomotor symptoms.    As part of wellness and prevention, the following topics were discussed with the patient:  Encouraged self breast awareness  Physical activity and regular exercised encouraged.   Injury prevention discussed.  Healthy weight discussed.  Nutrition  discussed.  Substance abuse/misuse discussed.  Sexual behavior/safe practices discussed.   Sexual transmitted disease prevention   Mental health discussed.     Plan:  Return in about 1 year (around 7/18/2026) for WWE.    Florinda Baron MD  7/18/2025 09:35 EDT

## 2025-07-23 LAB
CYTOLOGIST CVX/VAG CYTO: ABNORMAL
CYTOLOGY CVX/VAG DOC CYTO: ABNORMAL
CYTOLOGY CVX/VAG DOC THIN PREP: ABNORMAL
DX ICD CODE: ABNORMAL
DX ICD CODE: ABNORMAL
HPV I/H RISK 4 DNA CVX QL PROBE+SIG AMP: NEGATIVE
OTHER STN SPEC: ABNORMAL
PATHOLOGIST CVX/VAG CYTO: ABNORMAL
RECOM F/U CVX/VAG CYTO: ABNORMAL
SERVICE CMNT-IMP: ABNORMAL
STAT OF ADQ CVX/VAG CYTO-IMP: ABNORMAL

## (undated) DEVICE — FRCP BX RADJAW4 NDL 2.8 240CM LG OG BX80

## (undated) DEVICE — CANN O2 ETCO2 FITS ALL CONN CO2 SMPL A/ 7IN DISP LF

## (undated) DEVICE — LN SMPL CO2 SHTRM SD STREAM W/M LUER

## (undated) DEVICE — KT ORCA ORCAPOD DISP STRL

## (undated) DEVICE — SNAR POLYP CAPTIVATOR RND STFF 2.4 240CM 10MM 1P/U

## (undated) DEVICE — ADAPT CLN BIOGUARD AIR/H2O DISP

## (undated) DEVICE — TBG SXN CONN/F UNIV 1/4IN 10FT LF STRL

## (undated) DEVICE — PAD PLT GRND ELECTRD NESSY W/CORD DISP

## (undated) DEVICE — SENSR O2 OXIMAX FNGR A/ 18IN NONSTR